# Patient Record
Sex: MALE | Race: WHITE | Employment: FULL TIME | URBAN - METROPOLITAN AREA
[De-identification: names, ages, dates, MRNs, and addresses within clinical notes are randomized per-mention and may not be internally consistent; named-entity substitution may affect disease eponyms.]

---

## 2020-10-26 ENCOUNTER — OFFICE VISIT (OUTPATIENT)
Dept: FAMILY MEDICINE CLINIC | Facility: CLINIC | Age: 25
End: 2020-10-26

## 2020-10-26 VITALS
DIASTOLIC BLOOD PRESSURE: 70 MMHG | RESPIRATION RATE: 16 BRPM | TEMPERATURE: 98 F | HEIGHT: 69 IN | SYSTOLIC BLOOD PRESSURE: 122 MMHG | WEIGHT: 184 LBS | HEART RATE: 78 BPM | BODY MASS INDEX: 27.25 KG/M2

## 2020-10-26 DIAGNOSIS — F41.9 ANXIETY: ICD-10-CM

## 2020-10-26 DIAGNOSIS — Z00.00 PHYSICAL EXAM, ANNUAL: Primary | ICD-10-CM

## 2020-10-26 DIAGNOSIS — R53.83 FATIGUE, UNSPECIFIED TYPE: ICD-10-CM

## 2020-10-26 DIAGNOSIS — Z23 NEED FOR VACCINATION: ICD-10-CM

## 2020-10-26 PROCEDURE — 3078F DIAST BP <80 MM HG: CPT | Performed by: FAMILY MEDICINE

## 2020-10-26 PROCEDURE — 90686 IIV4 VACC NO PRSV 0.5 ML IM: CPT | Performed by: FAMILY MEDICINE

## 2020-10-26 PROCEDURE — 3008F BODY MASS INDEX DOCD: CPT | Performed by: FAMILY MEDICINE

## 2020-10-26 PROCEDURE — 99385 PREV VISIT NEW AGE 18-39: CPT | Performed by: FAMILY MEDICINE

## 2020-10-26 PROCEDURE — 3074F SYST BP LT 130 MM HG: CPT | Performed by: FAMILY MEDICINE

## 2020-10-26 PROCEDURE — 90471 IMMUNIZATION ADMIN: CPT | Performed by: FAMILY MEDICINE

## 2020-10-26 RX ORDER — BUSPIRONE HYDROCHLORIDE 5 MG/1
5 TABLET ORAL 2 TIMES DAILY
COMMUNITY
End: 2020-10-26

## 2020-10-26 RX ORDER — BUSPIRONE HYDROCHLORIDE 5 MG/1
5 TABLET ORAL 2 TIMES DAILY
Qty: 60 TABLET | Refills: 0 | Status: SHIPPED | OUTPATIENT
Start: 2020-10-26 | End: 2020-12-11

## 2020-10-26 RX ORDER — DULOXETIN HYDROCHLORIDE 60 MG/1
60 CAPSULE, DELAYED RELEASE ORAL DAILY
Qty: 30 CAPSULE | Refills: 0 | Status: SHIPPED | OUTPATIENT
Start: 2020-10-26 | End: 2020-11-23

## 2020-10-26 RX ORDER — DULOXETIN HYDROCHLORIDE 60 MG/1
60 CAPSULE, DELAYED RELEASE ORAL DAILY
COMMUNITY
End: 2020-10-26

## 2020-10-26 NOTE — PATIENT INSTRUCTIONS
Healthy diet. Stay active. Continue current meds. Return for medication visit in 3 to 4 weeks. Call [phone] to schedule fasting blood work.

## 2020-10-27 ENCOUNTER — MED REC SCAN ONLY (OUTPATIENT)
Dept: FAMILY MEDICINE CLINIC | Facility: CLINIC | Age: 25
End: 2020-10-27

## 2020-10-27 NOTE — PROGRESS NOTES
Mayelin Mansfield is a 30year old male who presents for a complete physical exam.   HPI:   Pt was diagnosed with depression for a while. He was living previously in [place] and he finished school. Right now he moved to [place].   He was prescribed medica OF SYSTEMS:   GENERAL: feels well otherwise  SKIN: denies any unusual skin lesions  EYES:denies blurred vision or double vision  HEENT: denies nasal congestion, sinus pain or ST  LUNGS: denies shortness of breath with exertion  CARDIOVASCULAR: denies chest FREE 0.5 ML      Meds & Refills for this Visit:  Requested Prescriptions     Signed Prescriptions Disp Refills   • DULoxetine HCl 60 MG Oral Cap DR Particles 30 capsule 0     Sig: Take 1 capsule (60 mg total) by mouth daily.    • busPIRone HCl 5 MG Oral Tab

## 2020-11-09 ENCOUNTER — LAB ENCOUNTER (OUTPATIENT)
Dept: LAB | Age: 25
End: 2020-11-09
Attending: FAMILY MEDICINE
Payer: COMMERCIAL

## 2020-11-09 DIAGNOSIS — Z00.00 PHYSICAL EXAM, ANNUAL: ICD-10-CM

## 2020-11-09 DIAGNOSIS — R53.83 FATIGUE, UNSPECIFIED TYPE: ICD-10-CM

## 2020-11-09 PROCEDURE — 81003 URINALYSIS AUTO W/O SCOPE: CPT

## 2020-11-09 PROCEDURE — 36415 COLL VENOUS BLD VENIPUNCTURE: CPT

## 2020-11-09 PROCEDURE — 82607 VITAMIN B-12: CPT

## 2020-11-09 PROCEDURE — 82306 VITAMIN D 25 HYDROXY: CPT

## 2020-11-09 PROCEDURE — 84443 ASSAY THYROID STIM HORMONE: CPT

## 2020-11-09 PROCEDURE — 80061 LIPID PANEL: CPT

## 2020-11-09 PROCEDURE — 80053 COMPREHEN METABOLIC PANEL: CPT

## 2020-11-09 PROCEDURE — 85025 COMPLETE CBC W/AUTO DIFF WBC: CPT

## 2020-11-23 RX ORDER — DULOXETIN HYDROCHLORIDE 60 MG/1
CAPSULE, DELAYED RELEASE ORAL
Qty: 30 CAPSULE | Refills: 0 | Status: SHIPPED | OUTPATIENT
Start: 2020-11-23 | End: 2021-01-07

## 2020-11-24 ENCOUNTER — TELEPHONE (OUTPATIENT)
Dept: FAMILY MEDICINE CLINIC | Facility: CLINIC | Age: 25
End: 2020-11-24

## 2020-11-24 NOTE — TELEPHONE ENCOUNTER
Left Pt voicemial to call back office to see if he has the same insurance. The one on file [facility] our clinic not covered in his plan.

## 2020-12-09 ENCOUNTER — PATIENT MESSAGE (OUTPATIENT)
Dept: FAMILY MEDICINE CLINIC | Facility: CLINIC | Age: 25
End: 2020-12-09

## 2020-12-10 NOTE — TELEPHONE ENCOUNTER
From: Ant Szymanski  To: Gualberto Alvarez MD  Sent: 12/9/2020 10:27 AM CST  Subject: Prescription Question    Hi Dr. Hawa Garcia,   I realized I don't have any more refills on my buspirone. I was wondering if you could call that into my pharmacy for me.

## 2020-12-11 RX ORDER — BUSPIRONE HYDROCHLORIDE 5 MG/1
5 TABLET ORAL 2 TIMES DAILY
Qty: 60 TABLET | Refills: 0 | Status: SHIPPED | OUTPATIENT
Start: 2020-12-11 | End: 2021-01-07

## 2020-12-23 ENCOUNTER — TELEPHONE (OUTPATIENT)
Dept: FAMILY MEDICINE CLINIC | Facility: CLINIC | Age: 25
End: 2020-12-23

## 2020-12-23 RX ORDER — ALPRAZOLAM 0.25 MG/1
0.25 TABLET ORAL 2 TIMES DAILY PRN
Qty: 15 TABLET | Refills: 0 | Status: SHIPPED | OUTPATIENT
Start: 2020-12-23 | End: 2021-05-05

## 2020-12-23 NOTE — TELEPHONE ENCOUNTER
Patient is going on a trip tomorrow morning . Pt is wondering if he can get a low dose of Alprazolam so he can have it for the next 4 days. His anxiety has been high. Needs it today. [id number] [address] [facility] [address]  [phone]

## 2020-12-23 NOTE — TELEPHONE ENCOUNTER
See below. Pt has never been prescribed this.  Has appt with you 1.7  Are you willing to send an rx for xanax for pt?

## 2021-01-07 NOTE — PROGRESS NOTES
Negra Ivan is a 30year old male. cc anxiety, vitamin D deficiency  HPI:   Patient is coming to the office for follow-up of anxiety. He moved from [place] last fall. His girlfriend got a job locally here.   He said that he been since 9 to 22 years o Tobacco Use      Smoking status: Never Smoker      Smokeless tobacco: Never Used    Alcohol use: Yes      Comment: rare    Drug use: Never       REVIEW OF SYSTEMS:   GENERAL HEALTH: feels well otherwise  SKIN: denies any unusual skin lesions or rashes  ANISH - 59 mg/dL    Triglycerides 76 30 - 149 mg/dL    LDL Cholesterol 110 (H) <100 mg/dL    VLDL 15 0 - 30 mg/dL    Non HDL Chol 125 <130 mg/dL    FASTING Yes    TSH W REFLEX TO FREE T4   Result Value Ref Range    TSH 0.845 0.358 - 3.740 mIU/mL   URINALYSIS WIT deficiency    Generalized anxiety disorder seems to be stable continue medication and counseling    Annual vitamin D deficiency new diagnosis replace as below.   Orders Placed This Encounter      VITAMIN D, 25-HYDROXY      Meds & Refills for this Visit:  Re

## 2021-01-07 NOTE — PATIENT INSTRUCTIONS
Continue duloxetine and BuSpar at current doses. Use alprazolam only as needed for flying. Continue using vitamin D prescription once a week for 12 weeks. Take vitamin D3 1000 units daily over-the-counter on other days of the week.     Recheck vitamin D
breast tenderness L

## 2021-02-01 RX ORDER — ERGOCALCIFEROL 1.25 MG/1
CAPSULE ORAL
Qty: 12 CAPSULE | Refills: 0 | OUTPATIENT
Start: 2021-02-01

## 2021-03-24 ENCOUNTER — TELEPHONE (OUTPATIENT)
Dept: FAMILY MEDICINE CLINIC | Facility: CLINIC | Age: 26
End: 2021-03-24

## 2021-03-24 NOTE — TELEPHONE ENCOUNTER
Spoke to patient. Here is synopsis:  5-6 years ago experienced vertigo for the 1st time while playing Lacrosse. \"spinning\" vertigo, exacerbated anxiety/balance was off and HR^. He does remember being hydrated with Gatorade.  Lasted about a minute and went

## 2021-03-24 NOTE — TELEPHONE ENCOUNTER
Pt states he has had vertigo when he does a higher intensity work out. He would like to start lacrosse again and would like to know if there is anything he can do to help with symptoms. Please advise. Thank you.

## 2021-03-25 NOTE — TELEPHONE ENCOUNTER
Patient would have to be evaluated for that since we never talked about he is having vertigo. I would like him to set up an appointment in the office I could see him on April 7 at 9:30 AM for evaluation it would be double book. And will go from there.

## 2021-05-03 RX ORDER — BUSPIRONE HYDROCHLORIDE 5 MG/1
5 TABLET ORAL 2 TIMES DAILY
Qty: 60 TABLET | Refills: 0 | Status: SHIPPED | OUTPATIENT
Start: 2021-05-03 | End: 2021-09-01

## 2021-05-03 RX ORDER — DULOXETIN HYDROCHLORIDE 60 MG/1
60 CAPSULE, DELAYED RELEASE ORAL DAILY
Qty: 90 CAPSULE | Refills: 0 | Status: CANCELLED | OUTPATIENT
Start: 2021-05-03

## 2021-05-03 RX ORDER — DULOXETIN HYDROCHLORIDE 60 MG/1
CAPSULE, DELAYED RELEASE ORAL
Qty: 90 CAPSULE | Refills: 0 | Status: SHIPPED | OUTPATIENT
Start: 2021-05-03 | End: 2021-07-30

## 2021-05-06 RX ORDER — ALPRAZOLAM 0.25 MG/1
0.25 TABLET ORAL 2 TIMES DAILY PRN
Qty: 15 TABLET | Refills: 0 | Status: SHIPPED | OUTPATIENT
Start: 2021-05-06

## 2021-07-29 RX ORDER — DULOXETIN HYDROCHLORIDE 60 MG/1
60 CAPSULE, DELAYED RELEASE ORAL DAILY
Qty: 90 CAPSULE | Refills: 0 | OUTPATIENT
Start: 2021-07-29

## 2021-07-29 RX ORDER — DULOXETIN HYDROCHLORIDE 60 MG/1
CAPSULE, DELAYED RELEASE ORAL
Qty: 90 CAPSULE | Refills: 0 | OUTPATIENT
Start: 2021-07-29

## 2021-07-30 RX ORDER — DULOXETIN HYDROCHLORIDE 60 MG/1
60 CAPSULE, DELAYED RELEASE ORAL DAILY
Qty: 30 CAPSULE | Refills: 0 | Status: SHIPPED | OUTPATIENT
Start: 2021-07-30 | End: 2021-09-01

## 2021-08-12 ENCOUNTER — OFFICE VISIT (OUTPATIENT)
Dept: FAMILY MEDICINE CLINIC | Facility: CLINIC | Age: 26
End: 2021-08-12
Payer: COMMERCIAL

## 2021-08-12 ENCOUNTER — HOSPITAL ENCOUNTER (OUTPATIENT)
Dept: ULTRASOUND IMAGING | Age: 26
Discharge: HOME | End: 2021-08-12
Attending: FAMILY MEDICINE
Payer: COMMERCIAL

## 2021-08-12 VITALS
RESPIRATION RATE: 16 BRPM | BODY MASS INDEX: 27.58 KG/M2 | SYSTOLIC BLOOD PRESSURE: 114 MMHG | HEART RATE: 76 BPM | TEMPERATURE: 98 F | OXYGEN SATURATION: 99 % | DIASTOLIC BLOOD PRESSURE: 76 MMHG | HEIGHT: 68 IN | WEIGHT: 182 LBS

## 2021-08-12 DIAGNOSIS — N50.819 TESTICLE TENDERNESS: ICD-10-CM

## 2021-08-12 DIAGNOSIS — N50.89 NODULE OF TESTIS: Primary | ICD-10-CM

## 2021-08-12 DIAGNOSIS — N50.89 NODULE OF TESTIS: ICD-10-CM

## 2021-08-12 LAB
APPEARANCE: CLEAR
BILIRUBIN: NEGATIVE
GLUCOSE (URINE DIPSTICK): NEGATIVE MG/DL
LEUKOCYTES: NEGATIVE
MULTISTIX LOT#: 5077 NUMERIC
NITRITE, URINE: NEGATIVE
OCCULT BLOOD: NEGATIVE
PH, URINE: 7 (ref 4.5–8)
PROTEIN (URINE DIPSTICK): NEGATIVE MG/DL
SPECIFIC GRAVITY: 1.02 (ref 1–1.03)
URINE-COLOR: YELLOW
UROBILINOGEN,SEMI-QN: 1 MG/DL (ref 0–1.9)

## 2021-08-12 PROCEDURE — 3078F DIAST BP <80 MM HG: CPT | Performed by: FAMILY MEDICINE

## 2021-08-12 PROCEDURE — 81003 URINALYSIS AUTO W/O SCOPE: CPT | Performed by: FAMILY MEDICINE

## 2021-08-12 PROCEDURE — 99214 OFFICE O/P EST MOD 30 MIN: CPT | Performed by: FAMILY MEDICINE

## 2021-08-12 PROCEDURE — 3008F BODY MASS INDEX DOCD: CPT | Performed by: FAMILY MEDICINE

## 2021-08-12 PROCEDURE — 87086 URINE CULTURE/COLONY COUNT: CPT | Performed by: FAMILY MEDICINE

## 2021-08-12 PROCEDURE — 76870 US EXAM SCROTUM: CPT | Performed by: FAMILY MEDICINE

## 2021-08-12 PROCEDURE — 3074F SYST BP LT 130 MM HG: CPT | Performed by: FAMILY MEDICINE

## 2021-08-12 PROCEDURE — 93975 VASCULAR STUDY: CPT | Performed by: FAMILY MEDICINE

## 2021-08-12 NOTE — PROGRESS NOTES
Talia Zazueta is a 30year old male. cc testicular tenderness small nodule  HPI:   Patient is coming to the office after he noticed to have pain in the left testicle yesterday. He felt small lump of the size of the small grain.   Applying pressure on that auscultation  CARDIO: RRR without murmur  GI: good BS's,no masses, HSM or tenderness  -there is small palpable nodule on the top of the left testicle around 2- 3 mm in diameter it is tender to touch.   No palpable lymphadenopathy  Psychiatric - alert  and Technologist)              FINDINGS:     TESTES:  Right testis measures 4.2 x 1.8 x 3.0 cm.  Left testis measures 4.4 x 2.3 x 3.0 cm.  No intratesticular lesion is seen.  Normal arterial and venous waveforms are present of the testes bilaterally.     EPIDI

## 2021-09-02 RX ORDER — BUSPIRONE HYDROCHLORIDE 5 MG/1
5 TABLET ORAL 2 TIMES DAILY
Qty: 60 TABLET | Refills: 0 | Status: SHIPPED | OUTPATIENT
Start: 2021-09-02

## 2021-09-02 RX ORDER — DULOXETIN HYDROCHLORIDE 60 MG/1
60 CAPSULE, DELAYED RELEASE ORAL DAILY
Qty: 60 CAPSULE | Refills: 0 | Status: SHIPPED | OUTPATIENT
Start: 2021-09-02 | End: 2021-11-02

## 2021-09-02 NOTE — TELEPHONE ENCOUNTER
busPIRone 5 MG Oral Tab    DULoxetine 60 MG Oral Cap DR Particles    Non protocol medication. Please see pended medications. Please sign if appropriate. Thank you    Last OV: 01/07/2021, 08/12/2021 upcoming appt on 10/01/2021.     Last refill: 05

## 2021-10-01 ENCOUNTER — TELEPHONE (OUTPATIENT)
Dept: FAMILY MEDICINE CLINIC | Facility: CLINIC | Age: 26
End: 2021-10-01

## 2021-10-01 NOTE — TELEPHONE ENCOUNTER
Left a message for the pt to let him know Dr. Toya Quispe will be logging on momentarily for their Video visit.

## 2021-10-06 NOTE — PROGRESS NOTES
Melvin Reagan is a 27year old male. cc anxiety follow-up, vertigo, problem with vision, right knee pain, fatigue, vitamin D deficiency  HPI:   Patient is coming to the office for follow-up of anxiety and medications.   Patient is taking duloxetine for ove symptoms usually will go away without any problem. Did not have vertigo for a while. Would like to discuss action plan. Patient has episodes of distorted vision.   He feels that there is some blurriness of the vision usually on one side of the visual f Never       REVIEW OF SYSTEMS:   GENERAL HEALTH: feels well otherwise  SKIN: denies any unusual skin lesions or rashes  HEENT no congestion no runny nose no sore throat, episodes of vertigo   eyes having some distortion of the vision at times,  Neck no Gulshan Hughes patient to neuro-ophthalmologist for evaluation. Patient did have eye examination done for a while.   Neurologic examination the office is normal.    Chronic pain of the right knee status post knee surgery will obtain x-ray for further evaluation refer pat

## 2021-10-06 NOTE — PATIENT INSTRUCTIONS
Increase buspirone to 7.5 mg twice a day. Continue duloxetine at current dose. Okay to use Tylenol as needed. Healthy diet. Keep good hydration. call ophthalmologist Dr. Bisi Beckham for evaluation of your eye symptoms. Do x-ray of your right knee.   Call o

## 2021-11-02 RX ORDER — DULOXETIN HYDROCHLORIDE 60 MG/1
CAPSULE, DELAYED RELEASE ORAL
Qty: 30 CAPSULE | Refills: 0 | Status: SHIPPED | OUTPATIENT
Start: 2021-11-02

## 2021-11-02 NOTE — TELEPHONE ENCOUNTER
DULOXETINE DR 60MG CAPSULES    Please see pended medications. Please sign if appropriate.       Thank you      Last OV: 10/06/2021    Last refill: 09/02/2021 for 60 tabs